# Patient Record
(demographics unavailable — no encounter records)

---

## 2024-10-29 NOTE — PHYSICAL EXAM
[de-identified] :  General: No acute distress, conversant, well-nourished. Head: Normocephalic, atraumatic Neck: trachea midline, FROM Heart: normotensive and normal rate and rhythm Lungs: No labored breathing Skin: No abrasions, no rashes, no edema Psych: Alert and oriented to person, place and time Extremities: no peripheral edema or digital cyanosis Gait: Normal gait. Can perform tandem gait.  Vascular: warm and well perfused distally, palpable distal pulses   MSK: Spine: + tenderness to palpation.  No step-off, no deformity.   NEURO: Sensation          Left           C5     2/2               C6     2/2               C7     2/2               C8     2/2              T1     2/2                        Right         C5     2/2               C6     2/2               C7     2/2               C8     2/2              T1     2/2        Motor:                                                Left             C5 (deltoid abduction)             5/5               C6 (biceps flexion)                   5/5                C7 (triceps extension)             5/5               C8 (finger flexion)                     5/5               T1 (interosseous)                     5/5                                                            Right           C5 (deltoid abduction)             5/5               C6 (biceps flexion)                   5/5                C7 (triceps extension)             5/5               C8 (finger flexion)                     5/5               T1 (interosseous)                     5/5                       Sensation Left L2  -  2/2            Left L3  -  2/2 Left L4  -  2/2 Left L5  -  2/2 Left S1  -  2/2   Right L2  -  2/2            Right L3  -  2/2 Right L4  -  2/2 Right L5  -  2/2 Right S1  -  2/2   Motor: Left L2 (hip flexion)                            5/5                Left L3 (knee extension)                   5/5                Left L4 (ankle dorsiflexion)                 5/5                Left L5 (long toe extensor)                5/5                Left S1 (ankle plantar flexion)           5/5   Right L2 (hip flexion)                            5/5                Right L3 (knee extension)                   5/5                Right L4 (ankle dorsiflexion)                 5/5                Right L5 (long toe extensor)                5/5                Right S1 (ankle plantar flexion)           5/5   Reflexes: Normal and symmetric Negative Spurlings test.  Negative Hoffmans reflex.  Negative clonus.  Down-going Babinski. [de-identified] : I ordered radiographs to evaluate the patient's symptoms. Thoracic 2 view radiographs taken in the office today show no dislocation or fracture. Thoracolumbar scoliosis.  Lumbar 4 view radiographs taken in the office today show no dislocation or fracture.  Lumbar spondylosis.  No instability on dynamic series. Thoracolumbar scoliosis.

## 2024-10-29 NOTE — ASSESSMENT
[FreeTextEntry1] : 34 y/o female presenting with worsening chronic back pain x 2-3 months. She complains of upper back pain that radiates into her shoulders. She also has lower back pain that intermittently radiates down her right leg. Sometimes tingling accompanies the right leg pain. She was treated in [facility] in the past and was diagnosed with scoliosis. She takes ibuprofen.  denies recent illness, fevers, numbness, weakness, balance problems, saddle anesthesia, urinary retention or fecal incontinence. A Irish language line  was used for the entire encounter. The patient was given a referral for physical therapy. Start cyclobenzaprine. Start meloxicam. F/U in 4-6 weeks. We discussed red flag symptoms that would require emergent evaluation. She knows to call with any questions or concerns or if her symptoms acutely worsen.

## 2024-10-29 NOTE — HISTORY OF PRESENT ILLNESS
[de-identified] : 34 y/o female presenting with worsening chronic back pain x 2-3 months. She complains of upper back pain that radiates into her shoulders. She also has lower back pain that intermittently radiates down her right leg. Sometimes tingling accompanies the right leg pain. She was treated in [facility] in the past and was diagnosed with scoliosis. She takes ibuprofen.  denies recent illness, fevers, numbness, weakness, balance problems, saddle anesthesia, urinary retention or fecal incontinence. A Icelandic language line  was used for the entire encounter.

## 2024-11-26 NOTE — HISTORY OF PRESENT ILLNESS
[FreeTextEntry1] : 35-year-old woman, with a past medical history of hyperlipidemia, prolactinoma, and scoliosis, presenting for establishment of cardiovascular care. Patient has experienced about 1 month of intermittent chest discomfort. 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 Patient otherwise denies shortness of breath/dyspnea on exertion, palpitations, dizziness/loss of consciousness, paroxysmal nocturnal dyspnea, orthopnea, headaches, abdominal pain, and lower extremity swelling.

## 2024-11-26 NOTE — ASSESSMENT
[FreeTextEntry1] : 35-year-old woman, with a past medical history of hyperlipidemia, prolactinoma, and scoliosis, presenting for establishment of cardiovascular care.   CP: Patient has experienced about 1 month of intermittent chest discomfort. 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 Patient otherwise denies shortness of breath/dyspnea on exertion, palpitations, dizziness/loss of consciousness, paroxysmal nocturnal dyspnea, orthopnea, headaches, abdominal pain, and lower extremity swelling. Physical exam and ECG today are unremarkable. Patient recently underwent Holter monitoring and an exercise stress test, both of which were normal. Therefore, pain is likely musculoskeletal in origin, but will further evaluate potential cardiovascular etiologies. - TTE ordered today - naproxen 500 mg PRN ordered for likely musculoskeletal etiology of pain (given normal stress test and ECG, and nature of pain at rest after long days of work) - patient instructed to contact me and/or seek immediate medical attention if symptoms recur or worsen  HLD: Latest lipid profile on October 28, 2024 with an elevated LDL (103) and total choletserol (215). - deferring medical therapy at this time in favor of lifestyle interventions (diet and exercise) - weight loss counseling - increase aerobic exercise as tolerated to aim for approximately 30 minutes of activity 5 days a week - heart healthy diet low in sodium, sugars and saturated fats and high in fruits, vegetables and whole grains - will reassess lipid panel in 3 months after consistent lifestyle modification, and readdress potential medical therapy if there is no meaningful change in the lipid panel at that time  F/u s/p TTE.

## 2024-11-26 NOTE — CARDIOLOGY SUMMARY
[de-identified] : 11/26/24: NSR at 66 bpm  [de-identified] : MCT 10/2024: Sinus rhythm, average HR 74 bpm [de-identified] : EST 10/25/24: No ECG evidence of ischemia at maximal stress

## 2024-12-02 NOTE — PHYSICAL EXAM
[Alert] : alert [Well Nourished] : well nourished [No Acute Distress] : no acute distress [Normal Sclera/Conjunctiva] : normal sclera/conjunctiva [No Proptosis] : no proptosis [Thyroid Not Enlarged] : the thyroid was not enlarged [No Thyroid Nodules] : no palpable thyroid nodules [No Respiratory Distress] : no respiratory distress [No Accessory Muscle Use] : no accessory muscle use [Clear to Auscultation] : lungs were clear to auscultation bilaterally [Normal S1, S2] : normal S1 and S2 [Normal Rate] : heart rate was normal [Regular Rhythm] : with a regular rhythm [No Edema] : no peripheral edema [Normal Bowel Sounds] : normal bowel sounds [Not Tender] : non-tender [Not Distended] : not distended [Soft] : abdomen soft [No Stigmata of Cushings Syndrome] : no stigmata of Cushings Syndrome [Oriented x3] : oriented to person, place, and time

## 2024-12-02 NOTE — REVIEW OF SYSTEMS
[Fatigue] : no fatigue [Recent Weight Gain (___ Lbs)] : no recent weight gain [Recent Weight Loss (___ Lbs)] : no recent weight loss [Fever] : no fever [Shortness Of Breath] : no shortness of breath [Nausea] : no nausea [Vomiting] : no vomiting [Irregular Menses] : regular menses [Galactorrhea] : no galactorrhea

## 2024-12-02 NOTE — HISTORY OF PRESENT ILLNESS
[FreeTextEntry1] : Ms. 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  PROLACTINOMA She was diagnosed with elevated prolactin levels due to galactorrhea around 2012, at age 23. She was living in [facility] back then. Her prolactin level was as high as the 200s, and an MRI revealed a microprolactinoma. She doesn't have any records available from that time, and its unclear if the rest of her pituitary hormones were assessed. She has been taking cabergoline 0.5 mg weekly since then. However, mentioned that her doctors tried tapering off the medication after 6 years of therapy, initially reducing the medication to one tablet every 15 days, and eventually stopping it for 3 months. Her prolactin levels increased when the medication was discontinued and she had to be placed back on it.   [place] from [facility] about 2 years ago. She established care with an endocrinologist in October 2023, but the consultation was hindered by a language barrier. Her doctor didn't speak Ukrainian and he didn't use an  to communicate with her making it difficult to understand the plans he outlined. She says that he just continued the cabergoline at the same dose she was on before.   12/2/24 - Since her last visit, a pituitary hormonal panel was unremarkable for any excess or deficiencies. Additionally, a repeat pituitary MRI with and without contrast revealed an adenoma measuring 3 mm (similar to previously reported size per patient). She denies having galactorrhea, or vision changes. Her menstrual periods remain regular, although she has been on oral contraceptive pills. She reports that her periods were always regular, even while off birth control pills.

## 2024-12-02 NOTE — ASSESSMENT
[FreeTextEntry1] : # Pituitary microadenoma / Prolactinoma - Microprolactinoma measuring 3 mm.  - No galactorrhea or menstrual irregularities.  - Prolactin level remains normal on current dose of cabergoline. There has not been any changes in size of the prolactinoma. Previous attempt at discontinuing the medication were unsuccessful with subsequent increase in prolactin.  - Continue with cabergoline 0.5 mg weekly for now. - Will attempt at tapering down during next visit while monitoring prolactin levels closely.   # Vitamin D deficiency - Continue vitamin D supplementation. - Check level with next set of labs.   Follow-up in 6 months.

## 2024-12-11 NOTE — ASSESSMENT
[FreeTextEntry1] : 36 y/o female followup with acute exacerbationo f chronic back pain. She complains of upper back pain that radiates into her shoulders. She also has lower back pain that intermittently radiates down her right leg. Sometimes tingling accompanies the right leg pain. She was treated in [facility] in the past and was diagnosed with scoliosis. She takes ibuprofen. denies recent illness, fevers, numbness, weakness, balance problems, saddle anesthesia, urinary retention or fecal incontinence. She has been doing PT with improvement. She has MRI for review. I independently reviewed her cervical MRI which shows degenerative changes without compression of the neural elements.  The patient was given a referral for physical therapy.  Continue PT. Can consider spinal injections. The patient was given a referral for consideration for spinal injections.  Continue meloxicam prn.  F/U in 4-6 weeks. We discussed red flag symptoms that would require emergent evaluation. She knows to call with any questions or concerns or if her symptoms acutely worsen.

## 2024-12-11 NOTE — PHYSICAL EXAM
[de-identified] : General: No acute distress, conversant, well-nourished. Head: Normocephalic, atraumatic Neck: trachea midline, FROM Heart: normotensive and normal rate and rhythm Lungs: No labored breathing Skin: No abrasions, no rashes, no edema Psych: Alert and oriented to person, place and time Extremities: no peripheral edema or digital cyanosis Gait: Normal gait. Can perform tandem gait.   Vascular: warm and well perfused distally, palpable distal pulses MSK: Spine:  No tenderness to palpation.    NEURO: Sensation           Left            C5     2/2                C6     2/2                C7     2/2                C8     2/2               T1     2/2                        Right          C5     2/2                C6     2/2                C7     2/2                C8     2/2               T1     2/2        Motor:                                                 Left              C5 (deltoid abduction)             5/5                C6 (biceps flexion)                   5/5                 C7 (triceps extension)             5/5                C8 (finger flexion)                     5/5                T1 (interosseous)                     5/5                                                            Right            C5 (deltoid abduction)             5/5                C6 (biceps flexion)                   5/5                 C7 (triceps extension)             5/5                C8 (finger flexion)                     5/5                T1 (interosseous)                     5/5                       Sensation  Left L2  -  2/2             Left L3  -  2/2 Left L4  -  2/2 Left L5  -  2/2 Left S1  -  2/2  Right L2  -  2/2             Right L3  -  2/2 Right L4  -  2/2 Right L5  -  2/2 Right S1  -  2/2  Motor:  Left L2 (hip flexion)                            5/5                 Left L3 (knee extension)                   5/5                 Left L4 (ankle dorsiflexion)                 5/5                 Left L5 (long toe extensor)                5/5                 Left S1 (ankle plantar flexion)           5/5  Right L2 (hip flexion)                            5/5                 Right L3 (knee extension)                   5/5                 Right L4 (ankle dorsiflexion)                 5/5                 Right L5 (long toe extensor)                5/5                 Right S1 (ankle plantar flexion)           5/5  Reflexes: Normal and symmetric Negative Spurlings test.  Negative Hoffmans reflex.   Negative clonus.  Down-going Babinski. [de-identified] : I independently reviewed her cervical MRI which shows degenerative changes without compression of the neural elements.

## 2024-12-11 NOTE — REASON FOR VISIT
[Follow-Up Visit] : a follow-up visit for [Back Pain] : back pain [FreeTextEntry2] : Pain level: 5/10

## 2024-12-11 NOTE — HISTORY OF PRESENT ILLNESS
[de-identified] : 34 y/o female followup with acute exacerbationo f chronic back pain. She complains of upper back pain that radiates into her shoulders. She also has lower back pain that intermittently radiates down her right leg. Sometimes tingling accompanies the right leg pain. She was treated in [facility] in the past and was diagnosed with scoliosis. She takes ibuprofen. denies recent illness, fevers, numbness, weakness, balance problems, saddle anesthesia, urinary retention or fecal incontinence. She has been doing PT with improvement. She has MRI for review.  A New Zealander language line  was used for the entire encounter.

## 2025-01-15 NOTE — PHYSICAL EXAM
[de-identified] : General: No acute distress, conversant, well-nourished. Head: Normocephalic, atraumatic Neck: trachea midline, FROM Heart: normotensive and normal rate and rhythm Lungs: No labored breathing Skin: No abrasions, no rashes, no edema Psych: Alert and oriented to person, place and time Extremities: no peripheral edema or digital cyanosis Gait: Normal gait. Can perform tandem gait. Vascular: warm and well perfused distally, palpable distal pulses MSK: Spine: No tenderness to palpation.  NEURO: Sensation  Left  C5 2/2  C6 2/2  C7 2/2  C8 2/2  T1 2/2    Right  C5 2/2  C6 2/2  C7 2/2  C8 2/2  T1 2/2  Motor:       Left  C5 (deltoid abduction)  5/5  C6 (biceps flexion)   5/5  C7 (triceps extension)  5/5  C8 (finger flexion)   5/5  T1 (interosseous)   5/5         Right  C5 (deltoid abduction)  5/5  C6 (biceps flexion)   5/5  C7 (triceps extension)  5/5  C8 (finger flexion)   5/5  T1 (interosseous)   5/5    Sensation Left L2 - 2/2  Left L3 - 2/2 Left L4 - 2/2 Left L5 - 2/2 Left S1 - 2/2  Right L2 - 2/2  Right L3 - 2/2 Right L4 - 2/2 Right L5 - 2/2 Right S1 - 2/2  Motor: Left L2 (hip flexion)    5/5  Left L3 (knee extension)   5/5  Left L4 (ankle dorsiflexion)   5/5  Left L5 (long toe extensor)  5/5  Left S1 (ankle plantar flexion)  5/5  Right L2 (hip flexion)    5/5  Right L3 (knee extension)   5/5  Right L4 (ankle dorsiflexion)   5/5  Right L5 (long toe extensor)  5/5  Right S1 (ankle plantar flexion)  5/5  Reflexes: Normal and symmetric Negative Spurlings test. Negative Hoffmans reflex. Negative clonus. Down-going Babinski.

## 2025-01-15 NOTE — ASSESSMENT
[FreeTextEntry1] : 36 y/o female followup with acute exacerbation of chronic back pain. She complains of upper back pain that radiates into her shoulders. She also has lower back pain that intermittently radiates down her right leg. denies recent illness, fevers, numbness, weakness, balance problems, saddle anesthesia, urinary retention or fecal incontinence. Since previous visit, patient had a cervical epidural which has provided excellent relief. PT was helping neck and lower back, but she had to discontinue because she had arthroscopic surgery on her left knee 12/30/24. She is doing PT for her knee. She saw her PM doctor yesterday and was given a script for a lumbar MRI.  A Portuguese language line  was used for the entire encounter. Discussed transition to PT for lower back when completed with knee. Proceed with MRI. She can consider injections. F/U after MRI. We discussed red flag symptoms that would require emergent evaluation. She knows to call with any questions or concerns or if her symptoms acutely worsen.

## 2025-01-15 NOTE — HISTORY OF PRESENT ILLNESS
[de-identified] : 34 y/o female followup with acute exacerbation of chronic back pain. She complains of upper back pain that radiates into her shoulders. She also has lower back pain that intermittently radiates down her right leg. denies recent illness, fevers, numbness, weakness, balance problems, saddle anesthesia, urinary retention or fecal incontinence. Since previous visit, patient had a cervical epidural which has provided excellent relief. PT was helping neck and lower back, but she had to discontinue because she had arthroscopic surgery on her left knee 12/30/24. She is doing PT for her knee. She saw her PM doctor yesterday and was given a script for a lumbar MRI.  A English language line  was used for the entire encounter.

## 2025-01-15 NOTE — DISCUSSION/SUMMARY
[de-identified] :  I, Dr. Morris personally performed the evaluation and management services for this established patient who presents today with (a) new problem(s)/exacerbation of (an) existing condition(s). That E/M includes conducting the clinically appropriate interval history &/or exam, assessing all new/exacerbated conditions, and establishing a new plan of care. Today, my PERRY, Tomasa June was here to observe my evaluation and management service for this new problem/exacerbated condition and follow the plan of care established by me going forward.

## 2025-01-15 NOTE — REASON FOR VISIT
[Follow-Up Visit] : a follow-up visit for [Back Pain] : back pain [Language Line ] : provided by Language Line   [FreeTextEntry2] : Pain level 3/10. Patient reports she not currently doing physical therapy she stopped due to surgery in the left knee states it was helpful for her back.   [Interpreters_IDNumber] : [id number] [Interpreters_FullName] : Estelle Modi  [TWNoteComboBox1] : Italian

## 2025-01-28 NOTE — CARDIOLOGY SUMMARY
[de-identified] : 11/26/24: NSR at 66 bpm  [de-identified] : MCT 10/2024: Sinus rhythm, average HR 74 bpm [de-identified] : EST 10/25/24: No ECG evidence of ischemia at maximal stress

## 2025-01-28 NOTE — ASSESSMENT
[FreeTextEntry1] : 35-year-old woman, with a past medical history of hyperlipidemia, prolactinoma, and scoliosis, presenting for follow up.   CP: RSOLVED. Patient had experienced about 1 month of intermittent chest discomfort prior to her initial visit. 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 Patient otherwise denies shortness of breath/dyspnea on exertion, palpitations, dizziness/loss of consciousness, paroxysmal nocturnal dyspnea, orthopnea, headaches, abdominal pain, and lower extremity swelling. Physical exam and ECG today were unremarkable. Patient recently underwent Holter monitoring and an exercise stress test, both of which were normal. Therefore, pain is likely musculoskeletal in origin. - transthoracic echocardiogram appreciated, demonstrating a normal biventricular systolic function and no significant valvular disease - naproxen 500 mg PRN ordered for likely musculoskeletal etiology of pain (given normal stress test and ECG, and nature of pain at rest after long days of work) - patient instructed to contact me and/or seek immediate medical attention if symptoms recur or worsen  HLD: Latest lipid profile on October 28, 2024 with an elevated LDL (103) and total choletserol (215). - deferring medical therapy at this time in favor of lifestyle interventions (diet and exercise) - weight loss counseling - increase aerobic exercise as tolerated to aim for approximately 30 minutes of activity 5 days a week - heart healthy diet low in sodium, sugars and saturated fats and high in fruits, vegetables and whole grains - will reassess lipid panel at follow up after consistent lifestyle modification, and readdress potential medical therapy if there is no meaningful change in the lipid panel at that time  F/u in 6 months.

## 2025-02-27 NOTE — ASSESSMENT
[FreeTextEntry1] : 34 y/o female followup with acute exacerbation of chronic back pain. She complains of upper back pain that radiates into her shoulders. She also has lower back pain that intermittently radiates down her right leg. denies recent illness, fevers, numbness, weakness, balance problems, saddle anesthesia, urinary retention or fecal incontinence. Since previous visit, patient continues to feel relief from cervical epidural. She is doing PT for her upper back which has been helpful. Her lower back continues to bother her. She has a recent lumbar MRI.  A Amharic language line  was used for the entire encounter. I independently reviewed lumbar MRI which shows degenerative changes without compression of neural elements. We discussed treatment options including PT, medications, and injections. Transition to PT for lower back. Continue meloxicam and cyclobenzaprine PRN. She can consider lumbar injections. F/U in 4-6 weeks. We discussed red flag symptoms that would require emergent evaluation. She knows to call with any questions or concerns or if her symptoms acutely worsen.

## 2025-02-27 NOTE — PHYSICAL EXAM
[de-identified] : General: No acute distress, conversant, well-nourished. Head: Normocephalic, atraumatic Neck: trachea midline, FROM Heart: normotensive and normal rate and rhythm Lungs: No labored breathing Skin: No abrasions, no rashes, no edema Psych: Alert and oriented to person, place and time Extremities: no peripheral edema or digital cyanosis Gait: Normal gait. Can perform tandem gait. Vascular: warm and well perfused distally, palpable distal pulses MSK: Spine: No tenderness to palpation.  NEURO: Sensation  Left  C5 2/2  C6 2/2  C7 2/2  C8 2/2  T1 2/2    Right  C5 2/2  C6 2/2  C7 2/2  C8 2/2  T1 2/2  Motor:       Left  C5 (deltoid abduction)  5/5  C6 (biceps flexion)   5/5  C7 (triceps extension)  5/5  C8 (finger flexion)   5/5  T1 (interosseous)   5/5         Right  C5 (deltoid abduction)  5/5  C6 (biceps flexion)   5/5  C7 (triceps extension)  5/5  C8 (finger flexion)   5/5  T1 (interosseous)   5/5    Sensation Left L2 - 2/2  Left L3 - 2/2 Left L4 - 2/2 Left L5 - 2/2 Left S1 - 2/2  Right L2 - 2/2  Right L3 - 2/2 Right L4 - 2/2 Right L5 - 2/2 Right S1 - 2/2  Motor: Left L2 (hip flexion)    5/5  Left L3 (knee extension)   5/5  Left L4 (ankle dorsiflexion)   5/5  Left L5 (long toe extensor)  5/5  Left S1 (ankle plantar flexion)  5/5  Right L2 (hip flexion)    5/5  Right L3 (knee extension)   5/5  Right L4 (ankle dorsiflexion)   5/5  Right L5 (long toe extensor)  5/5  Right S1 (ankle plantar flexion)  5/5  Reflexes: Normal and symmetric Negative Spurlings test. Negative Hoffmans reflex. Negative clonus. Down-going Babinski. [de-identified] : MRI R 1/27/2025:  FINDINGS:  For purposes of this dictation, the last well-formed disc space will be labeled L5-S1.  OSSEOUS STRUCTURES: Vertebral body heights are preserved.  No aggressive osseous lesion.  ALIGNMENT: Normal lordosis. No spondylolisthesis.  SPINAL CORD AND CONUS MEDULLARIS: No definite cord signal abnormality.  PARASPINAL AND INTRA-ABDOMINAL SOFT TISSUES: No significant finding.  DISCS: Mild/moderate loss of disc height and associated degenerative change at L5-S1, mild at L4-5.  The following axial levels are imaged and detailed below:  L1-L2: No significant spinal canal or neural foraminal stenosis.  L2-L3: No significant spinal canal or neural foraminal stenosis.  L3-L4: No significant spinal canal or neural foraminal stenosis.   L4-L5: Disc bulge, central herniation, ligamentum flavum thickening and facet arthropathy cause: Mild spinal canal stenosis. Mild bilateral lateral recess stenosis. Mild right neural foraminal stenosis. Mild left neural foraminal stenosis.  L5-S1: Disc osteophyte complex with central eminence, ligamentum flavum thickening and facet arthropathy cause: Mild spinal canal stenosis. Mild/moderate right neural foraminal stenosis. Mild-to-moderate left neural foraminal stenosis.  IMPRESSION:  L5-S1: Disc osteophyte complex with central eminence, ligamentum flavum thickening and facet arthropathy cause: Mild spinal canal stenosis. Mild/moderate right neural foraminal stenosis. Mild-to-moderate left neural foraminal stenosis.  L4-L5: Disc bulge, central herniation, ligamentum flavum thickening and facet arthropathy cause: Mild spinal canal stenosis. Mild bilateral lateral recess stenosis. Mild right neural foraminal stenosis. Mild left neural foraminal stenosis.

## 2025-02-27 NOTE — DISCUSSION/SUMMARY
[de-identified] :  I, Dr. Morris personally performed the evaluation and management services for this established patient who presents today with (a) new problem(s)/exacerbation of (an) existing condition(s). That E/M includes conducting the clinically appropriate interval history &/or exam, assessing all new/exacerbated conditions, and establishing a new plan of care. Today, my PERRY, Tomasa June was here to observe my evaluation and management service for this new problem/exacerbated condition and follow the plan of care established by me going forward.

## 2025-02-27 NOTE — REASON FOR VISIT
[Follow-Up Visit] : a follow-up visit for [Back Pain] : back pain [FreeTextEntry2] : pain level 5/10, started physical therapy

## 2025-02-27 NOTE — HISTORY OF PRESENT ILLNESS
[de-identified] : 36 y/o female followup with acute exacerbation of chronic back pain. She complains of upper back pain that radiates into her shoulders. She also has lower back pain that intermittently radiates down her right leg. denies recent illness, fevers, numbness, weakness, balance problems, saddle anesthesia, urinary retention or fecal incontinence. Since previous visit, patient continues to feel relief from cervical epidural. She is doing PT for her upper back which has been helpful. Her lower back continues to bother her. She has a recent lumbar MRI.  A Swedish language line  was used for the entire encounter.

## 2025-04-10 NOTE — REASON FOR VISIT
[Follow-Up Visit] : a follow-up visit for [Back Pain] : back pain [FreeTextEntry2] : pain level 7/10, physical therapy is helping pain

## 2025-05-22 NOTE — REASON FOR VISIT
[Follow-Up Visit] : a follow-up visit for [Back Pain] : back pain [Neck Pain] : neck pain [FreeTextEntry2] : pain level 4/10, continuing physical therapy

## 2025-05-22 NOTE — PHYSICAL EXAM
[de-identified] : General: No acute distress, conversant, well-nourished. Head: Normocephalic, atraumatic Neck: trachea midline, FROM Heart: normotensive and normal rate and rhythm Lungs: No labored breathing Skin: No abrasions, no rashes, no edema Psych: Alert and oriented to person, place and time Extremities: no peripheral edema or digital cyanosis Gait: Normal gait. Can perform tandem gait.   Vascular: warm and well perfused distally, palpable distal pulses MSK: Spine:  No tenderness to palpation.    NEURO: Sensation           Left            C5     2/2                C6     2/2                C7     2/2                C8     2/2               T1     2/2                        Right          C5     2/2                C6     2/2                C7     2/2                C8     2/2               T1     2/2        Motor:                                                 Left              C5 (deltoid abduction)             5/5                C6 (biceps flexion)                   5/5                 C7 (triceps extension)             5/5                C8 (finger flexion)                     5/5                T1 (interosseous)                     5/5                                                            Right            C5 (deltoid abduction)             5/5                C6 (biceps flexion)                   5/5                 C7 (triceps extension)             5/5                C8 (finger flexion)                     5/5                T1 (interosseous)                     5/5                       Sensation  Left L2  -  2/2             Left L3  -  2/2 Left L4  -  2/2 Left L5  -  2/2 Left S1  -  2/2  Right L2  -  2/2             Right L3  -  2/2 Right L4  -  2/2 Right L5  -  2/2 Right S1  -  2/2  Motor:  Left L2 (hip flexion)                            5/5                 Left L3 (knee extension)                   5/5                 Left L4 (ankle dorsiflexion)                 5/5                 Left L5 (long toe extensor)                5/5                 Left S1 (ankle plantar flexion)           5/5  Right L2 (hip flexion)                            5/5                 Right L3 (knee extension)                   5/5                 Right L4 (ankle dorsiflexion)                 5/5                 Right L5 (long toe extensor)                5/5                 Right S1 (ankle plantar flexion)           5/5  Reflexes: Normal and symmetric Negative Spurlings test.  Negative Hoffmans reflex.   Negative clonus.  Down-going Babinski. [de-identified] : I independently reviewed her thoracic MRI which shows degenerative changes without compression of the neural elements.   MRI of the thoracic spine 05/08/2025  Mild thoracic dextrocurvature.  Disc desiccation and mild disc space narrowing at T4-5 and T5-6.  Tiny central disc herniation at T4-5. Minimal annular bulging at T5-6.

## 2025-05-22 NOTE — ASSESSMENT
[FreeTextEntry1] : 35 y/o female followup with acute exacerbation of chronic back pain. She complains of upper back pain that radiates into her shoulders. She also has lower back pain that intermittently radiates down her right leg. Sometimes tingling accompanies the right leg pain. She was treated in [facility] in the past and was diagnosed with scoliosis. She takes ibuprofen. She denies recent illness, fevers, numbness, weakness, balance problems, saddle anesthesia, urinary retention or fecal incontinence. She has been doing PT which helps. Gabapentin helps. I independently reviewed her thoracic MRI which shows degenerative changes without compression of the neural elements. We discussed treatment options including physical therapy, medications, spinal injections. The patient was given a referral for consideration for spinal injections.  The patient was given a referral for physical therapy.  Given gabapentin 100 bid and gabapentin 300 qhs.  F/U 2-3 months. We discussed red flag symptoms that would require emergent evaluation. She knows to call with any questions or concerns or if her symptoms acutely worsen.

## 2025-05-22 NOTE — HISTORY OF PRESENT ILLNESS
[de-identified] : 35 y/o female followup with acute exacerbation of chronic back pain. She complains of upper back pain that radiates into her shoulders. She also has lower back pain that intermittently radiates down her right leg. Sometimes tingling accompanies the right leg pain. She was treated in [facility] in the past and was diagnosed with scoliosis. She takes ibuprofen. She denies recent illness, fevers, numbness, weakness, balance problems, saddle anesthesia, urinary retention or fecal incontinence. She has been doing PT which helps. Gabapentin helps.  A Mongolian language line  was used for the entire encounter.